# Patient Record
Sex: MALE | Race: WHITE
[De-identification: names, ages, dates, MRNs, and addresses within clinical notes are randomized per-mention and may not be internally consistent; named-entity substitution may affect disease eponyms.]

---

## 2019-07-30 ENCOUNTER — HOSPITAL ENCOUNTER (EMERGENCY)
Dept: HOSPITAL 54 - ER | Age: 49
Discharge: ANOTHER HOSPITAL | End: 2019-07-30
Payer: MEDICAID

## 2019-07-30 VITALS — SYSTOLIC BLOOD PRESSURE: 136 MMHG | DIASTOLIC BLOOD PRESSURE: 90 MMHG

## 2019-07-30 VITALS — BODY MASS INDEX: 25.19 KG/M2 | HEIGHT: 72 IN | WEIGHT: 186 LBS

## 2019-07-30 DIAGNOSIS — R11.10: ICD-10-CM

## 2019-07-30 DIAGNOSIS — I50.9: ICD-10-CM

## 2019-07-30 DIAGNOSIS — F90.9: ICD-10-CM

## 2019-07-30 DIAGNOSIS — R00.0: ICD-10-CM

## 2019-07-30 DIAGNOSIS — I21.3: Primary | ICD-10-CM

## 2019-07-30 DIAGNOSIS — F12.10: ICD-10-CM

## 2019-07-30 LAB
ALBUMIN SERPL BCP-MCNC: 2.7 G/DL (ref 3.4–5)
ALP SERPL-CCNC: 88 U/L (ref 46–116)
ALT SERPL W P-5'-P-CCNC: 32 U/L (ref 12–78)
AST SERPL W P-5'-P-CCNC: 25 U/L (ref 15–37)
BASOPHILS # BLD AUTO: 0.1 /CMM (ref 0–0.2)
BASOPHILS NFR BLD AUTO: 0.5 % (ref 0–2)
BILIRUB DIRECT SERPL-MCNC: 0.1 MG/DL (ref 0–0.2)
BILIRUB SERPL-MCNC: 0.4 MG/DL (ref 0.2–1)
BUN SERPL-MCNC: 27 MG/DL (ref 7–18)
CALCIUM SERPL-MCNC: 8.8 MG/DL (ref 8.5–10.1)
CHLORIDE SERPL-SCNC: 104 MMOL/L (ref 98–107)
CO2 SERPL-SCNC: 24 MMOL/L (ref 21–32)
CREAT SERPL-MCNC: 1.3 MG/DL (ref 0.6–1.3)
EOSINOPHIL NFR BLD AUTO: 0.4 % (ref 0–6)
GLUCOSE SERPL-MCNC: 93 MG/DL (ref 74–106)
HCT VFR BLD AUTO: 42 % (ref 39–51)
HGB BLD-MCNC: 13.8 G/DL (ref 13.5–17.5)
LIPASE SERPL-CCNC: 92 U/L (ref 73–393)
LYMPHOCYTES NFR BLD AUTO: 2.9 /CMM (ref 0.8–4.8)
LYMPHOCYTES NFR BLD AUTO: 20.2 % (ref 20–44)
MCHC RBC AUTO-ENTMCNC: 33 G/DL (ref 31–36)
MCV RBC AUTO: 89 FL (ref 80–96)
MONOCYTES NFR BLD AUTO: 0.8 /CMM (ref 0.1–1.3)
MONOCYTES NFR BLD AUTO: 5.3 % (ref 2–12)
NEUTROPHILS # BLD AUTO: 10.7 /CMM (ref 1.8–8.9)
NEUTROPHILS NFR BLD AUTO: 73.6 % (ref 43–81)
PLATELET # BLD AUTO: 295 /CMM (ref 150–450)
POTASSIUM SERPL-SCNC: 4.5 MMOL/L (ref 3.5–5.1)
PROT SERPL-MCNC: 6.4 G/DL (ref 6.4–8.2)
RBC # BLD AUTO: 4.73 MIL/UL (ref 4.5–6)
SODIUM SERPL-SCNC: 140 MMOL/L (ref 136–145)
WBC NRBC COR # BLD AUTO: 14.5 K/UL (ref 4.3–11)

## 2019-07-30 PROCEDURE — 36415 COLL VENOUS BLD VENIPUNCTURE: CPT

## 2019-07-30 PROCEDURE — 80048 BASIC METABOLIC PNL TOTAL CA: CPT

## 2019-07-30 PROCEDURE — 85730 THROMBOPLASTIN TIME PARTIAL: CPT

## 2019-07-30 PROCEDURE — 96375 TX/PRO/DX INJ NEW DRUG ADDON: CPT

## 2019-07-30 PROCEDURE — 96365 THER/PROPH/DIAG IV INF INIT: CPT

## 2019-07-30 PROCEDURE — 85025 COMPLETE CBC W/AUTO DIFF WBC: CPT

## 2019-07-30 PROCEDURE — 96374 THER/PROPH/DIAG INJ IV PUSH: CPT

## 2019-07-30 PROCEDURE — 93005 ELECTROCARDIOGRAM TRACING: CPT

## 2019-07-30 PROCEDURE — 96367 TX/PROPH/DG ADDL SEQ IV INF: CPT

## 2019-07-30 PROCEDURE — 84484 ASSAY OF TROPONIN QUANT: CPT

## 2019-07-30 PROCEDURE — 83690 ASSAY OF LIPASE: CPT

## 2019-07-30 PROCEDURE — 71045 X-RAY EXAM CHEST 1 VIEW: CPT

## 2019-07-30 PROCEDURE — 99291 CRITICAL CARE FIRST HOUR: CPT

## 2019-07-30 PROCEDURE — 80076 HEPATIC FUNCTION PANEL: CPT

## 2019-07-30 PROCEDURE — 83880 ASSAY OF NATRIURETIC PEPTIDE: CPT

## 2019-07-30 PROCEDURE — 96361 HYDRATE IV INFUSION ADD-ON: CPT

## 2019-07-30 NOTE — NUR
patient transported to [facility] for higher level of care 
in no apparent distress. Report given to [facility] accepting nurse.

## 2019-07-30 NOTE — NUR
ER  SPOKE TO [facility] CARDIOLOGIST. CARDIOLOGIST ACCEPTED. 
AWAITING ANOTHER CALL FROM HENRY

## 2019-07-30 NOTE — NUR
PATIENT CAME IN TO THE ER C/O SOB X 2 DAYS, WAS AT URGENT CARE, ADVISED TO GO 
ED FOR ABNORMAL EKG DENIES CHEST PAIN. TAKES ADDERALL FOR ADD. CONNECTED TO THE 
MONITOR AND PULSE OX. KEPT COMFORTABLE, WILL CONTINUE TO MONITOR ACCORDINGLY.

## 2019-07-30 NOTE — NUR
[facility] CALLED FOR CARDIOLOGY CONSULT,SPOKE WITH ART RN, VERIFIED THAT HE 
RECEIVE COPIES OF FACESHEET,EKG AND LABS

## 2019-08-07 ENCOUNTER — CARE COORDINATION (OUTPATIENT)
Dept: CARDIOLOGY | Facility: CLINIC | Age: 49
End: 2019-08-07

## 2019-08-07 PROBLEM — I25.9 MYOCARDIAL ISCHEMIA: Status: ACTIVE | Noted: 2019-07-30

## 2019-08-07 PROBLEM — I21.4 ACUTE NON-ST ELEVATION MYOCARDIAL INFARCTION (NSTEMI) (H): Status: ACTIVE | Noted: 2019-07-31

## 2019-08-07 PROBLEM — I51.3 MURAL THROMBUS OF CARDIAC APEX: Status: ACTIVE | Noted: 2019-08-03

## 2019-08-07 PROBLEM — N17.9 AKI (ACUTE KIDNEY INJURY) (H): Status: ACTIVE | Noted: 2019-08-03

## 2019-08-07 PROBLEM — I50.21 ACUTE SYSTOLIC CONGESTIVE HEART FAILURE (H): Status: ACTIVE | Noted: 2019-07-31

## 2019-08-07 RX ORDER — FUROSEMIDE 40 MG
40 TABLET ORAL DAILY
COMMUNITY
Start: 2019-08-06

## 2019-08-07 RX ORDER — LORAZEPAM 0.5 MG/1
0.5 TABLET ORAL PRN
COMMUNITY

## 2019-08-07 RX ORDER — LISINOPRIL 2.5 MG/1
2.5 TABLET ORAL DAILY
COMMUNITY
Start: 2019-08-06

## 2019-08-07 RX ORDER — WARFARIN SODIUM 10 MG/1
TABLET ORAL
COMMUNITY
Start: 2019-08-05

## 2019-08-07 RX ORDER — CARVEDILOL 6.25 MG/1
6.25 TABLET ORAL 2 TIMES DAILY WITH MEALS
COMMUNITY
Start: 2019-08-05

## 2019-08-07 RX ORDER — ESCITALOPRAM OXALATE 10 MG/1
10 TABLET ORAL DAILY
COMMUNITY

## 2019-08-07 NOTE — PROGRESS NOTES
Referral- Heart Failure      [facility] Notes:     August 7, 2019 -      Received message from Dr. Varghese regarding this patient. He is a new patient referral - Son of Dr. Wan kid s .     He is currently living in [place] but is returning home at the end of August.     Sounds like just diagnosed with CHF and they drained 4 L out of this lungs ??    Hospital records in Care Everywhere     Patients primary clinic contacted and sending notes.     Dr. Varghese will overbook if needed     Patients primary doctor contact information is:    [facility]    [facility]     [address]     [place], [place] [address]    Insurance - Not obtained     Patient Contact -   Yes - Cell phone preferred. Wonderful Man.     Home Phone [phone]   Mobile [phone]       Sending to F Nurse to Triage    CHRISTINE [facility]ton [facility]  CORE/Heart Failure   Heart Failure Referral Coordinator

## 2019-08-08 NOTE — PROGRESS NOTES
Called and spoke with patient yesterday.  He reports a h/o CAD, ischemic cardiomyopathy, and recently was hospitalized for heart failure [place] where he lives. Reportedly has an EF of 20-25%, 4L of volume was removed with diuresis. Discharge summary from hospitalization can be viewed in care everywhere.  He is coming into town to stay with his parents for a few weeks and is seeking an appointment with Dr. Varghese for office consult. Patient states his insurance is [employer] [facility] which is not an employer sponsored policy, but rather a state funded policy. He is unaware of his benefits and/or if they will pay for out of state care. I informed him I told him I would call them to obtain more information. Patient stated if they don't pay for the appointment, he could not afford to pay out of pocket     *I called [facility] yesterday afternoon. They informed me his insurance coverage is [place] Medicaid for residents of [facility]. He does not have any type of out of state benefits unless it is an emergency.     8/8 -  Called patient and informed him that I was told he has no out of state insurance benefits with the exception of emergency care. Patient verbalized he cannot afford to pay out of pocket for the office visit or any testing. Notified Dr. Varghese via email yesterday afternoon. Will hold appt for him for few days should another alternative become available.

## 2019-08-20 ENCOUNTER — EXTERNAL ORDER RESULTS (OUTPATIENT)
Dept: CARDIOLOGY | Facility: CLINIC | Age: 49
End: 2019-08-20

## 2019-08-20 LAB
ALBUMIN SERPL-MCNC: 3.6 G/DL
ALBUMIN/GLOB SERPL: 1.4 {RATIO}
ALP SERPL-CCNC: 92 U/L
ALT SERPL W/O P-5'-P-CCNC: 61 IU/L
APTT PPP: 37 SEC
APTT PPP: 46 SEC
APTT PPP: 49 SEC
AST SERPL-CCNC: 29 IU/L
BASOPHILS # BLD AUTO: 0.1 X10E3/UL
BASOPHILS # BLD AUTO: 0.1 X10E3/UL
BASOPHILS NFR BLD AUTO: 1 %
BASOPHILS NFR BLD AUTO: 1 %
BILIRUB SERPL-MCNC: <0.2 MG/DL
BUN SERPL-MCNC: 18 MG/DL
BUN/CREATININE RATIO: 19
CALCITRIOL(1,25 DI-OH VIT D): 40 PG/ML
CALCIUM SERPL-MCNC: 9.4 MG/DL
CHLORIDE SERPLBLD-SCNC: 105 MMOL/L
CHOLEST SERPL-MCNC: ABNORMAL MG/DL
CHOLESTEROL TOTAL: 226
CO2 SERPL-SCNC: 23 MMOL/L
EOSINOPHIL # BLD AUTO: 0.2 X10E3/UL
EOSINOPHIL # BLD AUTO: 0.2 X10E3/UL
EOSINOPHIL NFR BLD AUTO: 2 %
EOSINOPHIL NFR BLD AUTO: 2 %
ERYTHROCYTE [DISTWIDTH] IN BLOOD BY AUTOMATED COUNT: 15.1 %
ERYTHROCYTE [DISTWIDTH] IN BLOOD BY AUTOMATED COUNT: 15.1 %
GFR IF AFRICAN AMERICAN - HISTORICAL: 107
GFR IF NOT AFRICAN AMERICAN - HISTORICAL: 93
GLOBULIN, TOTAL: 2.5 G/DL
GLUCOSE SERPL-MCNC: 84 MG/DL (ref 70–99)
HBA1C MFR BLD: 5.9 % (ref 0–5.6)
HCT VFR BLD AUTO: 45.1 %
HCT VFR BLD AUTO: 45.1 %
HDLC SERPL-MCNC: 36 MG/DL
HEMOGLOBIN: 14 G/DL (ref 13.3–17.7)
HEMOGLOBIN: 14 G/DL (ref 13.3–17.7)
IMMATURE GRANS (ABS): 0 X10E3/UL
IMMATURE GRANS (ABS): 0 X10E3/UL
IMMATURE GRANULOCYTES: 0 %
IMMATURE GRANULOCYTES: 0 %
INR PPP: 1.7
INR PPP: 2.7
INR PPP: 4.6
LDLC SERPL CALC-MCNC: 145 MG/DL
LDLC SERPL CALC-MCNC: 148 MG/DL
LYMPHOCYTES # BLD AUTO: 2.7 X10E3/UL
LYMPHOCYTES # BLD AUTO: 2.7 X10E3/UL
LYMPHOCYTES NFR BLD AUTO: 33 %
LYMPHOCYTES NFR BLD AUTO: 33 %
Lab: 0.95
MCH RBC QN AUTO: 28.7 PG
MCH RBC QN AUTO: 28.7 PG
MCHC RBC AUTO-ENTMCNC: 31 G/DL
MCHC RBC AUTO-ENTMCNC: 31 G/DL
MCV RBC AUTO: 92 FL
MCV RBC AUTO: 92 FL
MONOCYTES # BLD AUTO: 0.6 X10E3/UL
MONOCYTES # BLD AUTO: 0.6 X10E3/UL
MONOCYTES NFR BLD AUTO: 7 %
MONOCYTES NFR BLD AUTO: 7 %
NEUTROPHILS # BLD AUTO: 4.7 X10E3/UL
NEUTROPHILS # BLD AUTO: 4.7 X10E3/UL
NEUTROPHILS NFR BLD AUTO: 57 %
NEUTROPHILS NFR BLD AUTO: 57 %
PLATELET # BLD AUTO: 314 K/UL
PLATELETS: 314 X10E3/UL
POTASSIUM SERPL-SCNC: 4.4 MMOL/L
PROTEIN TOTAL (EXTERNAL): 6.1
PROTHROMBIN TIME: 17 SECONDS
PROTHROMBIN TIME: 26.6 SECONDS
PROTHROMBIN TIME: 45.9 SECONDS
RBC # BLD AUTO: 4.88 M/UL
RBC # BLD AUTO: 4.88 M/UL
SODIUM (EXTERNAL): 139
TRIGL SERPL-MCNC: 225 MG/DL
TRIGL SERPL-MCNC: ABNORMAL MG/DL
TSH SERPL-ACNC: 1.58 MCU/ML
VITAMIN D, 25-HYDROXY: 27.6 NG/ML
VLDLC SERPL CALC-MCNC: 45 MG/DL
WBC # BLD AUTO: 8.2 10^9/L
WBC # BLD AUTO: 8.2 10^9/L

## 2019-08-23 ENCOUNTER — TELEPHONE (OUTPATIENT)
Dept: CARDIOLOGY | Facility: CLINIC | Age: 49
End: 2019-08-23

## 2019-08-23 DIAGNOSIS — I50.21 ACUTE SYSTOLIC CONGESTIVE HEART FAILURE (H): Primary | ICD-10-CM

## 2019-08-23 DIAGNOSIS — I51.3 LV (LEFT VENTRICULAR) MURAL THROMBUS: ICD-10-CM

## 2019-08-23 NOTE — PROGRESS NOTES
Date: 8/23/2019    Time of Call: 2:35 PM     Diagnosis:  LV clot     [ VORB ] Ordering provider: Dr Varghese    Order: INR     Order received by: Kelly Bush RN

## 2019-08-23 NOTE — TELEPHONE ENCOUNTER
Patient cannot be seen in clinic however will be referred to my colleague in [place] after record review.     Family informed of the plan.

## 2019-08-26 ENCOUNTER — TRANSFERRED RECORDS (OUTPATIENT)
Dept: HEALTH INFORMATION MANAGEMENT | Facility: CLINIC | Age: 49
End: 2019-08-26

## 2019-09-06 LAB
INR PPP: 2.6
PROTHROMBIN TIME: 28.1 SECONDS